# Patient Record
Sex: FEMALE | URBAN - METROPOLITAN AREA
[De-identification: names, ages, dates, MRNs, and addresses within clinical notes are randomized per-mention and may not be internally consistent; named-entity substitution may affect disease eponyms.]

---

## 2020-02-10 ENCOUNTER — OFFICE VISIT (OUTPATIENT)
Dept: URBAN - METROPOLITAN AREA CLINIC 81 | Facility: CLINIC | Age: 85
End: 2020-02-10
Payer: COMMERCIAL

## 2020-02-10 DIAGNOSIS — H52.4 PRESBYOPIA: ICD-10-CM

## 2020-02-10 DIAGNOSIS — H04.123 DRY EYE SYNDROME OF BILATERAL LACRIMAL GLANDS: ICD-10-CM

## 2020-02-10 PROCEDURE — 92004 COMPRE OPH EXAM NEW PT 1/>: CPT | Performed by: OPTOMETRIST

## 2020-02-10 ASSESSMENT — INTRAOCULAR PRESSURE
OS: 12
OD: 14

## 2020-02-10 ASSESSMENT — KERATOMETRY
OD: 46.50
OS: 46.00

## 2020-02-10 ASSESSMENT — VISUAL ACUITY
OD: 20/20
OS: 20/25

## 2020-02-10 NOTE — IMPRESSION/PLAN
Impression: Vitreous degeneration, bilateral: H43.813. Plan: Discussed Dx of posterior vitreous detachment. Discussed signs and symptoms of retinal tear/detachment. Pt to RTC PRN with any change to visual status. 

RTC 1 year/PRN

## 2021-02-08 ENCOUNTER — OFFICE VISIT (OUTPATIENT)
Dept: URBAN - METROPOLITAN AREA CLINIC 81 | Facility: CLINIC | Age: 86
End: 2021-02-08
Payer: COMMERCIAL

## 2021-02-08 DIAGNOSIS — H43.813 VITREOUS DEGENERATION, BILATERAL: Chronic | ICD-10-CM

## 2021-02-08 DIAGNOSIS — Z96.1 PRESENCE OF INTRAOCULAR LENS: Primary | Chronic | ICD-10-CM

## 2021-02-08 PROCEDURE — 92014 COMPRE OPH EXAM EST PT 1/>: CPT | Performed by: OPTOMETRIST

## 2021-02-08 ASSESSMENT — VISUAL ACUITY
OD: 20/20
OS: 20/20

## 2021-02-08 ASSESSMENT — KERATOMETRY
OD: 46.50
OS: 46.50

## 2021-02-08 ASSESSMENT — INTRAOCULAR PRESSURE
OD: 12
OS: 12